# Patient Record
Sex: FEMALE | Race: WHITE | Employment: OTHER | URBAN - METROPOLITAN AREA
[De-identification: names, ages, dates, MRNs, and addresses within clinical notes are randomized per-mention and may not be internally consistent; named-entity substitution may affect disease eponyms.]

---

## 2017-01-02 ENCOUNTER — OFFICE VISIT (OUTPATIENT)
Dept: FAMILY MEDICINE CLINIC | Facility: CLINIC | Age: 82
End: 2017-01-02

## 2017-01-02 VITALS
DIASTOLIC BLOOD PRESSURE: 74 MMHG | TEMPERATURE: 98 F | WEIGHT: 126 LBS | SYSTOLIC BLOOD PRESSURE: 120 MMHG | HEIGHT: 62 IN | BODY MASS INDEX: 23.19 KG/M2 | OXYGEN SATURATION: 98 % | RESPIRATION RATE: 16 BRPM | HEART RATE: 72 BPM

## 2017-01-02 DIAGNOSIS — J00 NASOPHARYNGITIS ACUTE: ICD-10-CM

## 2017-01-02 DIAGNOSIS — N30.00 ACUTE CYSTITIS WITHOUT HEMATURIA: ICD-10-CM

## 2017-01-02 DIAGNOSIS — R30.0 DYSURIA: Primary | ICD-10-CM

## 2017-01-02 LAB
BILIRUBIN: NEGATIVE
GLUCOSE (URINE DIPSTICK): NEGATIVE MG/DL
KETONES (URINE DIPSTICK): NEGATIVE MG/DL
MULTISTIX LOT#: ABNORMAL NUMERIC
NITRITE, URINE: NEGATIVE
PH, URINE: 5.5 (ref 4.5–8)
PROTEIN (URINE DIPSTICK): 30 MG/DL
SPECIFIC GRAVITY: 1.02 (ref 1–1.03)
URINE-COLOR: YELLOW
UROBILINOGEN,SEMI-QN: 0.2 MG/DL (ref 0–1.9)

## 2017-01-02 PROCEDURE — 99213 OFFICE O/P EST LOW 20 MIN: CPT | Performed by: NURSE PRACTITIONER

## 2017-01-02 PROCEDURE — 87088 URINE BACTERIA CULTURE: CPT | Performed by: NURSE PRACTITIONER

## 2017-01-02 PROCEDURE — 81003 URINALYSIS AUTO W/O SCOPE: CPT | Performed by: NURSE PRACTITIONER

## 2017-01-02 PROCEDURE — 87086 URINE CULTURE/COLONY COUNT: CPT | Performed by: NURSE PRACTITIONER

## 2017-01-02 PROCEDURE — 87186 SC STD MICRODIL/AGAR DIL: CPT | Performed by: NURSE PRACTITIONER

## 2017-01-02 RX ORDER — NITROFURANTOIN 25; 75 MG/1; MG/1
100 CAPSULE ORAL 2 TIMES DAILY
Qty: 14 CAPSULE | Refills: 0 | Status: SHIPPED | OUTPATIENT
Start: 2017-01-02 | End: 2020-09-22

## 2017-01-02 RX ORDER — MEMANTINE HYDROCHLORIDE 10 MG/1
10 TABLET ORAL 2 TIMES DAILY
Refills: 6 | Status: ON HOLD | COMMUNITY
Start: 2016-12-14 | End: 2021-03-02

## 2017-01-02 RX ORDER — PRAVASTATIN SODIUM 10 MG
10 TABLET ORAL
Refills: 2 | COMMUNITY
Start: 2016-12-24 | End: 2020-09-22

## 2017-01-02 RX ORDER — AMLODIPINE BESYLATE 5 MG/1
5 TABLET ORAL
Refills: 6 | COMMUNITY
Start: 2016-12-14 | End: 2020-09-22

## 2017-01-02 NOTE — PROGRESS NOTES
CHIEF COMPLAINT:   Patient presents with:  Cold: nasal congestion and cough sx x 3 days. Urinary Frequency sx x 3-4 days. HPI:   Antonia Hadley is a 80year old female who presents for upper respiratory symptoms for 3- 4 days.  She is visiting he Cefuroxime Axetil (CEFTIN) 250 MG Oral Tab Take 1 tablet (250 mg total) by mouth 2 (two) times daily. TAKE WITH FOOD. Disp: 20 tablet Rfl: 0   Fluticasone Propionate 50 MCG/ACT Nasal Suspension 1 spray by Nasal route daily.  1 spray each nostril twice a day ABDOMEN:  BSs present x4. Non tender throughout. No guarding or rebound. BACK: No CVAT.       ASSESSMENT AND PLAN:   Venecia Harrell is a 80year old female who presents with     ASSESSMENT: Dysuria  (primary encounter diagnosis)  Acute cystitis wit You have a viral upper respiratory illness (URI), which is another term for the common cold. This illness is contagious during the first few days. It is spread through the air by coughing and sneezing.  It may also be spread by direct contact (touching the · Cough with lots of colored sputum (mucus)  · Severe headache; face, neck, or ear pain  · Difficulty swallowing due to throat pain  · Fever of 100.4°F (38°C)  Call 911, or get immediate medical care  Call emergency services right away if any of these occu · Urinary retention, being unable to urinate  · Unable to hold urine in (urinary incontinence)  · Fever  · Loss of appetite  · Confusion (in older adults)  Causes  Bladder infections are not contagious.  You can't get one from someone else, from a toilet se · If you are given phenazopydridine to reduce burning with urination, it will cause your urine to become a bright orange color. This can stain clothing.   Care and prevention  These self-care steps can help prevent future infections:  · Drink plenty of flui · Repeated vomiting, or unable to keep medicine down  · Weakness or dizziness  · Vaginal discharge  · Pain, redness, or swelling in the outer vaginal area (labia)  © 3118-7004 The [facility] [place] [address] [place] [address].  All rights

## 2017-01-02 NOTE — PATIENT INSTRUCTIONS
Viral Upper Respiratory Illness (Adult)  You have a viral upper respiratory illness (URI), which is another term for the common cold. This illness is contagious during the first few days. It is spread through the air by coughing and sneezing.  It may also Call your healthcare provider right away if any of these occur:  · Cough with lots of colored sputum (mucus)  · Severe headache; face, neck, or ear pain  · Difficulty swallowing due to throat pain  · Fever of 100.4°F (38°C)  Call 911, or get immediate medi · Cloudy, strong, or bad smelling urine  · Urinary retention, being unable to urinate  · Unable to hold urine in (urinary incontinence)  · Fever  · Loss of appetite  · Confusion (in older adults)  Causes  Bladder infections are not contagious.  You can't ge · If you are given phenazopydridine to reduce burning with urination, it will cause your urine to become a bright orange color. This can stain clothing.   Care and prevention  These self-care steps can help prevent future infections:  · Drink plenty of flui · Repeated vomiting, or unable to keep medicine down  · Weakness or dizziness  · Vaginal discharge  · Pain, redness, or swelling in the outer vaginal area (labia)  © 0323-6512 The 706 [facility] [place], [address] [place] [address].  All rights

## 2017-01-03 NOTE — PROGRESS NOTES
Quick Note:    Prelim result noted. Will await final culture. Patient taking [facility].   ______

## 2017-01-05 ENCOUNTER — TELEPHONE (OUTPATIENT)
Dept: FAMILY MEDICINE CLINIC | Facility: CLINIC | Age: 82
End: 2017-01-05

## 2017-01-05 ENCOUNTER — HOSPITAL ENCOUNTER (OUTPATIENT)
Age: 82
Discharge: HOME | End: 2017-01-05
Attending: FAMILY MEDICINE
Payer: MEDICARE

## 2017-01-05 VITALS
DIASTOLIC BLOOD PRESSURE: 65 MMHG | HEART RATE: 74 BPM | SYSTOLIC BLOOD PRESSURE: 141 MMHG | TEMPERATURE: 98 F | OXYGEN SATURATION: 99 % | RESPIRATION RATE: 16 BRPM

## 2017-01-05 DIAGNOSIS — J01.00 ACUTE NON-RECURRENT MAXILLARY SINUSITIS: Primary | ICD-10-CM

## 2017-01-05 PROCEDURE — 99213 OFFICE O/P EST LOW 20 MIN: CPT

## 2017-01-05 PROCEDURE — 99204 OFFICE O/P NEW MOD 45 MIN: CPT

## 2017-01-05 RX ORDER — CEFUROXIME AXETIL 250 MG/1
250 TABLET ORAL 2 TIMES DAILY
Qty: 20 TABLET | Refills: 0 | Status: SHIPPED | OUTPATIENT
Start: 2017-01-05 | End: 2020-09-22

## 2017-01-05 RX ORDER — FLUTICASONE PROPIONATE 50 MCG
SPRAY, SUSPENSION (ML) NASAL DAILY
COMMUNITY
End: 2020-09-22

## 2017-01-05 RX ORDER — FLUTICASONE PROPIONATE 50 MCG
1 SPRAY, SUSPENSION (ML) NASAL DAILY
Qty: 16 G | Refills: 0 | Status: SHIPPED | OUTPATIENT
Start: 2017-01-05 | End: 2018-01-05

## 2017-01-05 NOTE — TELEPHONE ENCOUNTER
Granddaughter called on behalf of patient. States she is feeling worse, has a productive cough. Denies dyspnea. Advised her to take patient to ED or IC for possible CXR and eval before she flies back to [place].   Agrees with plan, no further question

## 2017-01-05 NOTE — ED PROVIDER NOTES
Patient Seen in: [facility] END    History   Patient presents with:  Cough  Sinus Problem    Stated Complaint: URI     HPI    This 44-year-old female presents with a 10 day history of sinus congestion, stuffy nose, cough which is pr equivalent per week      Review of Systems    Positive for stated complaint: URI   Other systems are as noted in HPI. Constitutional and vital signs reviewed. All other systems reviewed and negative except as noted above.     PSFH elements reviewed fr primary doctor back in [place] if she is not improving or sooner if she has bloody mucus or shortness of breath.       Disposition and Plan     Clinical Impression:  Acute non-recurrent maxillary sinusitis  (primary encounter diagnosis)    Disposition:

## 2020-09-22 ENCOUNTER — APPOINTMENT (OUTPATIENT)
Dept: GENERAL RADIOLOGY | Facility: HOSPITAL | Age: 85
End: 2020-09-22
Attending: EMERGENCY MEDICINE
Payer: MEDICARE

## 2020-09-22 PROCEDURE — 71045 X-RAY EXAM CHEST 1 VIEW: CPT | Performed by: EMERGENCY MEDICINE

## 2020-09-23 NOTE — ED PROVIDER NOTES
Patient Seen in: [facility] Emergency Department      History   Patient presents with:  Difficulty Breathing    Stated Complaint: RHONDA    HPI    Patient is a pleasant 75-year-old female nursing home resident, sent for evaluation of dyspnea.     Family comfortably on the cart, in no apparent distress. HEENT: Head is without evidence of trauma. Extraocular muscles are intact. Pupils are equal and reactive to light. Oropharynx is pink and moist.  NECK: Neck is supple and nontender.  The trachea is midline ROUTINE   CBC W/ DIFFERENTIAL        Xr Chest Ap Portable  (cpt=71045)    Result Date: 9/22/2020  PROCEDURE:  XR CHEST AP PORTABLE  (CPT=71045)  TECHNIQUE:  AP chest radiograph was obtained. COMPARISON:  None.   INDICATIONS:  RHONDA  PATIENT STATED HISTORY: ( Prescribed:  Current Discharge Medication List

## 2020-09-23 NOTE — ED NOTES
Called report to [facility] RN at [employer] in [place]. Pt instructed to follow up with PCP in the next week. Pts daughter will be driving patient home.

## 2020-09-23 NOTE — ED INITIAL ASSESSMENT (HPI)
PT INFORMED A STAFF MEMBER AT NURSING HOME That SHE WAS HAVING TROUBLE BREATHING. PT WAS IN NO DISTRESS, WAS GIVEN LORAZEPAM, FAMILY WANTED PATIENT TRANSPORTED FOR EVALUATION. PT HAS NO COMPLAINTS. PT HAS DEMENTIA, ORIENTED TO SELF.

## 2021-02-16 ENCOUNTER — HOSPITAL ENCOUNTER (EMERGENCY)
Facility: HOSPITAL | Age: 86
Discharge: HOME | End: 2021-02-16
Attending: EMERGENCY MEDICINE
Payer: MEDICARE

## 2021-02-16 ENCOUNTER — APPOINTMENT (OUTPATIENT)
Dept: CT IMAGING | Facility: HOSPITAL | Age: 86
End: 2021-02-16
Attending: EMERGENCY MEDICINE
Payer: MEDICARE

## 2021-02-16 VITALS
TEMPERATURE: 97 F | SYSTOLIC BLOOD PRESSURE: 132 MMHG | HEART RATE: 63 BPM | HEIGHT: 62 IN | RESPIRATION RATE: 18 BRPM | BODY MASS INDEX: 20.29 KG/M2 | WEIGHT: 110.25 LBS | DIASTOLIC BLOOD PRESSURE: 66 MMHG | OXYGEN SATURATION: 100 %

## 2021-02-16 DIAGNOSIS — S09.90XA INJURY OF HEAD, INITIAL ENCOUNTER: Primary | ICD-10-CM

## 2021-02-16 PROCEDURE — 99284 EMERGENCY DEPT VISIT MOD MDM: CPT

## 2021-02-16 PROCEDURE — 70450 CT HEAD/BRAIN W/O DYE: CPT | Performed by: EMERGENCY MEDICINE

## 2021-02-16 NOTE — ED PROVIDER NOTES
Patient Seen in: [facility] Emergency Department      History   Patient presents with:  Fall    Stated Complaint: Fall    HPI/Subjective:   HPI    51-year-old female with history of dementia sent from nursing home for evaluation of head injury.   Brea Calzada nontender. No abdominal masses. No peritoneal signs   Extremities: Full range of motion at all joints of all 4 extremities without discomfort. No obvious deformities. No evidence of traumatic injury to the extremities.   No edema, normal peripheral puls

## 2021-02-16 NOTE — ED INITIAL ASSESSMENT (HPI)
27-year-old female arrived via EMS from [place] due to an un witnessed fall, patient was found by nursing staff sitting up in her room. No on blood thinners, usually A&Ox1 which is her baseline. 5cm hematoma to back of her head.

## 2021-02-19 ENCOUNTER — APPOINTMENT (OUTPATIENT)
Dept: CT IMAGING | Facility: HOSPITAL | Age: 86
End: 2021-02-19
Attending: EMERGENCY MEDICINE
Payer: MEDICARE

## 2021-02-19 ENCOUNTER — HOSPITAL ENCOUNTER (EMERGENCY)
Facility: HOSPITAL | Age: 86
Discharge: HOME | End: 2021-02-20
Attending: EMERGENCY MEDICINE
Payer: MEDICARE

## 2021-02-19 VITALS
HEART RATE: 68 BPM | HEIGHT: 63 IN | OXYGEN SATURATION: 98 % | SYSTOLIC BLOOD PRESSURE: 117 MMHG | RESPIRATION RATE: 16 BRPM | DIASTOLIC BLOOD PRESSURE: 68 MMHG | TEMPERATURE: 97 F | WEIGHT: 110.25 LBS | BODY MASS INDEX: 19.54 KG/M2
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
## 2021-02-19 DIAGNOSIS — F03.90 DEMENTIA WITHOUT BEHAVIORAL DISTURBANCE, UNSPECIFIED DEMENTIA TYPE (HCC): ICD-10-CM

## 2021-02-19 DIAGNOSIS — S09.90XA MINOR HEAD INJURY, INITIAL ENCOUNTER: ICD-10-CM

## 2021-02-19 DIAGNOSIS — T14.8XXA ABRASION: ICD-10-CM

## 2021-02-19 DIAGNOSIS — S00.83XA CONTUSION OF FACE, INITIAL ENCOUNTER: ICD-10-CM

## 2021-02-19 PROCEDURE — 70450 CT HEAD/BRAIN W/O DYE: CPT | Performed by: EMERGENCY MEDICINE

## 2021-02-19 PROCEDURE — 99284 EMERGENCY DEPT VISIT MOD MDM: CPT

## 2021-02-19 PROCEDURE — 72125 CT NECK SPINE W/O DYE: CPT | Performed by: EMERGENCY MEDICINE

## 2021-02-20 NOTE — ED PROVIDER NOTES
Patient Seen in: [facility] Emergency Department      History   No chief complaint on file.     Stated Complaint: Fall    HPI/Subjective:   HPI    27-year-old female who has dementia and therefore is unable to give us any history level 5 exemption ta General: No tenderness, deformity or signs of injury. Skin:     General: Skin is warm. Neurological:      Mental Status: She is alert. Mental status is at baseline.               ED Course   Labs Reviewed - No data to display       Ct Brain Or Head ( CONCLUSION:  1. Stable chronic changes. No acute disease. Limited evaluation of the inferior cerebellum due to artifact on all imaging sequences.     Dictated by (CST): Jeremie Glynn MD on 2/19/2021 at 11:11 PM     Finalized by (CST): Ross Leonard CONCLUSION:  Right parietal scalp hematoma without fracture. No hemorrhage. Incidental note is made of moderate atrophy especially the temporal lobes. There is a 1 cm calcified meningioma right high convexity parasagittal parietal region.    Dictated by Patient is noted to have the injury to her head and appears that her fall was related to her getting out of bed. It is unclear if she had loss of consciousness and because of advanced age and dementia she had a CT scan of her head.   There is no acute intr

## 2021-02-20 NOTE — ED INITIAL ASSESSMENT (HPI)
Pt arrived via EMS from [facility] for fall. Pt was put to bed and found 30 minutes after on the floor, presents with bump to forehead and abrasion to nose. Pt baseline is A&Ox1 per medics.

## 2021-02-27 ENCOUNTER — HOSPITAL ENCOUNTER (OUTPATIENT)
Facility: HOSPITAL | Age: 86
Setting detail: OBSERVATION
Discharge: HOSPICE | End: 2021-03-02
Attending: EMERGENCY MEDICINE | Admitting: HOSPITALIST
Payer: MEDICARE

## 2021-02-27 ENCOUNTER — APPOINTMENT (OUTPATIENT)
Dept: GENERAL RADIOLOGY | Facility: HOSPITAL | Age: 86
End: 2021-02-27
Attending: EMERGENCY MEDICINE
Payer: MEDICARE

## 2021-02-27 DIAGNOSIS — R41.82 ALTERED MENTAL STATUS, UNSPECIFIED ALTERED MENTAL STATUS TYPE: Primary | ICD-10-CM

## 2021-02-27 DIAGNOSIS — R13.10 DYSPHAGIA, UNSPECIFIED TYPE: ICD-10-CM

## 2021-02-27 LAB
ALBUMIN SERPL-MCNC: 2.5 G/DL (ref 3.4–5)
ALBUMIN/GLOB SERPL: 0.6 {RATIO} (ref 1–2)
ALP LIVER SERPL-CCNC: 85 U/L
ALT SERPL-CCNC: 10 U/L
ANION GAP SERPL CALC-SCNC: 6 MMOL/L (ref 0–18)
AST SERPL-CCNC: 13 U/L (ref 15–37)
BASOPHILS # BLD AUTO: 0.03 X10(3) UL (ref 0–0.2)
BASOPHILS NFR BLD AUTO: 0.3 %
BILIRUB SERPL-MCNC: 0.3 MG/DL (ref 0.1–2)
BILIRUB UR QL STRIP.AUTO: NEGATIVE
BUN BLD-MCNC: 12 MG/DL (ref 7–18)
BUN/CREAT SERPL: 13 (ref 10–20)
CALCIUM BLD-MCNC: 8.9 MG/DL (ref 8.5–10.1)
CHLORIDE SERPL-SCNC: 103 MMOL/L (ref 98–112)
CLARITY UR REFRACT.AUTO: CLEAR
CO2 SERPL-SCNC: 32 MMOL/L (ref 21–32)
COLOR UR AUTO: YELLOW
CREAT BLD-MCNC: 0.92 MG/DL
DEPRECATED RDW RBC AUTO: 55.8 FL (ref 35.1–46.3)
EOSINOPHIL # BLD AUTO: 0.08 X10(3) UL (ref 0–0.7)
EOSINOPHIL NFR BLD AUTO: 0.7 %
ERYTHROCYTE [DISTWIDTH] IN BLOOD BY AUTOMATED COUNT: 16.6 % (ref 11–15)
GLOBULIN PLAS-MCNC: 4.4 G/DL (ref 2.8–4.4)
GLUCOSE BLD-MCNC: 76 MG/DL (ref 70–99)
GLUCOSE UR STRIP.AUTO-MCNC: NEGATIVE MG/DL
HCT VFR BLD AUTO: 37.5 %
HGB BLD-MCNC: 12.1 G/DL
HYALINE CASTS #/AREA URNS AUTO: PRESENT /LPF
IMM GRANULOCYTES # BLD AUTO: 0.04 X10(3) UL (ref 0–1)
IMM GRANULOCYTES NFR BLD: 0.3 %
LEUKOCYTE ESTERASE UR QL STRIP.AUTO: NEGATIVE
LYMPHOCYTES # BLD AUTO: 1.09 X10(3) UL (ref 1–4)
LYMPHOCYTES NFR BLD AUTO: 9.2 %
M PROTEIN MFR SERPL ELPH: 6.9 G/DL (ref 6.4–8.2)
MCH RBC QN AUTO: 29.7 PG (ref 26–34)
MCHC RBC AUTO-ENTMCNC: 32.3 G/DL (ref 31–37)
MCV RBC AUTO: 91.9 FL
MONOCYTES # BLD AUTO: 1.29 X10(3) UL (ref 0.1–1)
MONOCYTES NFR BLD AUTO: 10.9 %
NEUTROPHILS # BLD AUTO: 9.26 X10 (3) UL (ref 1.5–7.7)
NEUTROPHILS # BLD AUTO: 9.26 X10(3) UL (ref 1.5–7.7)
NEUTROPHILS NFR BLD AUTO: 78.6 %
NITRITE UR QL STRIP.AUTO: NEGATIVE
OSMOLALITY SERPL CALC.SUM OF ELEC: 291 MOSM/KG (ref 275–295)
PH UR STRIP.AUTO: 6 [PH] (ref 5–8)
PLATELET # BLD AUTO: 225 10(3)UL (ref 150–450)
POTASSIUM SERPL-SCNC: 4.2 MMOL/L (ref 3.5–5.1)
PROT UR STRIP.AUTO-MCNC: NEGATIVE MG/DL
RBC # BLD AUTO: 4.08 X10(6)UL
RBC UR QL AUTO: NEGATIVE
SARS-COV-2 RNA RESP QL NAA+PROBE: NOT DETECTED
SODIUM SERPL-SCNC: 141 MMOL/L (ref 136–145)
SP GR UR STRIP.AUTO: 1.01 (ref 1–1.03)
T4 FREE SERPL-MCNC: 1 NG/DL (ref 0.8–1.7)
TROPONIN I SERPL-MCNC: <0.045 NG/ML (ref ?–0.04)
TSI SER-ACNC: 3.77 MIU/ML (ref 0.36–3.74)
UROBILINOGEN UR STRIP.AUTO-MCNC: <2 MG/DL
VALPROATE SERPL-MCNC: 62.6 UG/ML (ref 50–100)
WBC # BLD AUTO: 11.8 X10(3) UL (ref 4–11)

## 2021-02-27 PROCEDURE — 71045 X-RAY EXAM CHEST 1 VIEW: CPT | Performed by: EMERGENCY MEDICINE

## 2021-02-27 PROCEDURE — 99220 INITIAL OBSERVATION CARE,LEVL III: CPT | Performed by: INTERNAL MEDICINE

## 2021-02-27 RX ORDER — DOCUSATE SODIUM 100 MG/1
100 CAPSULE, LIQUID FILLED ORAL 2 TIMES DAILY
Status: DISCONTINUED | OUTPATIENT
Start: 2021-02-27 | End: 2021-02-28

## 2021-02-27 RX ORDER — MELATONIN
325
Status: ON HOLD | COMMUNITY
End: 2021-03-02

## 2021-02-27 RX ORDER — CETIRIZINE HYDROCHLORIDE 10 MG/1
10 TABLET ORAL DAILY
Status: DISCONTINUED | OUTPATIENT
Start: 2021-02-27 | End: 2021-02-27

## 2021-02-27 RX ORDER — MEMANTINE HYDROCHLORIDE 10 MG/1
10 TABLET ORAL
Status: DISCONTINUED | OUTPATIENT
Start: 2021-02-28 | End: 2021-02-28

## 2021-02-27 RX ORDER — RISPERIDONE 0.25 MG/1
0.25 TABLET, FILM COATED ORAL NIGHTLY PRN
COMMUNITY

## 2021-02-27 RX ORDER — DONEPEZIL HYDROCHLORIDE 10 MG/1
10 TABLET, FILM COATED ORAL NIGHTLY
Status: ON HOLD | COMMUNITY
End: 2021-03-02

## 2021-02-27 RX ORDER — DEXTROSE AND SODIUM CHLORIDE 5; .45 G/100ML; G/100ML
INJECTION, SOLUTION INTRAVENOUS CONTINUOUS
Status: DISCONTINUED | OUTPATIENT
Start: 2021-02-27 | End: 2021-03-02

## 2021-02-27 RX ORDER — DIVALPROEX SODIUM 125 MG/1
500 CAPSULE, COATED PELLETS ORAL NIGHTLY
Status: ON HOLD | COMMUNITY
End: 2021-03-02

## 2021-02-27 RX ORDER — MONTELUKAST SODIUM 10 MG/1
10 TABLET ORAL NIGHTLY
Status: DISCONTINUED | OUTPATIENT
Start: 2021-02-27 | End: 2021-02-27

## 2021-02-27 RX ORDER — RISPERIDONE 0.25 MG/1
0.25 TABLET, FILM COATED ORAL NIGHTLY PRN
Status: DISCONTINUED | OUTPATIENT
Start: 2021-02-27 | End: 2021-02-28

## 2021-02-27 RX ORDER — DIVALPROEX SODIUM 125 MG/1
500 CAPSULE, COATED PELLETS ORAL NIGHTLY
Status: DISCONTINUED | OUTPATIENT
Start: 2021-02-27 | End: 2021-02-28

## 2021-02-27 RX ORDER — ROSUVASTATIN CALCIUM 10 MG/1
10 TABLET, COATED ORAL NIGHTLY
Status: DISCONTINUED | OUTPATIENT
Start: 2021-02-27 | End: 2021-02-28

## 2021-02-27 RX ORDER — NALOXONE HYDROCHLORIDE 1 MG/ML
1 INJECTION INTRAMUSCULAR; INTRAVENOUS; SUBCUTANEOUS ONCE
Status: COMPLETED | OUTPATIENT
Start: 2021-02-27 | End: 2021-02-27

## 2021-02-27 RX ORDER — DONEPEZIL HYDROCHLORIDE 5 MG/1
5 TABLET, FILM COATED ORAL NIGHTLY
Status: DISCONTINUED | OUTPATIENT
Start: 2021-02-27 | End: 2021-02-28

## 2021-02-27 RX ORDER — MONTELUKAST SODIUM 10 MG/1
10 TABLET ORAL EVERY MORNING
COMMUNITY

## 2021-02-27 RX ORDER — HEPARIN SODIUM 5000 [USP'U]/ML
5000 INJECTION, SOLUTION INTRAVENOUS; SUBCUTANEOUS EVERY 8 HOURS SCHEDULED
Status: DISCONTINUED | OUTPATIENT
Start: 2021-02-27 | End: 2021-03-02

## 2021-02-27 RX ORDER — NALOXONE HYDROCHLORIDE 0.4 MG/ML
0.4 INJECTION, SOLUTION INTRAMUSCULAR; INTRAVENOUS; SUBCUTANEOUS ONCE
Status: COMPLETED | OUTPATIENT
Start: 2021-02-27 | End: 2021-02-27

## 2021-02-27 RX ORDER — MELATONIN
325
Status: DISCONTINUED | OUTPATIENT
Start: 2021-02-28 | End: 2021-02-28

## 2021-02-27 RX ORDER — ESCITALOPRAM OXALATE 10 MG/1
15 TABLET ORAL DAILY
COMMUNITY

## 2021-02-27 RX ORDER — SODIUM CHLORIDE 9 MG/ML
INJECTION, SOLUTION INTRAVENOUS CONTINUOUS
Status: ACTIVE | OUTPATIENT
Start: 2021-02-27 | End: 2021-02-27

## 2021-02-27 RX ORDER — ACETAMINOPHEN 325 MG/1
650 TABLET ORAL EVERY 6 HOURS PRN
Status: DISCONTINUED | OUTPATIENT
Start: 2021-02-27 | End: 2021-03-02

## 2021-02-27 NOTE — ED INITIAL ASSESSMENT (HPI)
Rapid decline in health over the last 2 weeks. usuallu A=O X 2 now not really talking much. Not very active just lays in bed stairs into space. Also recently failed swallow study.

## 2021-02-27 NOTE — H&P
[facility] H[facility]PITAL[facility]  History and Physical     Hollie Gonzalez Patient Status:  Emergency    1931 MRN [id number]   Location [address] Attending Charity Recio MD   Hosp Day # 0 PCP Evelia Gipson MD     Chief Co Rfl:     •  naproxen 250 MG Oral Tab, Take 250 mg by mouth 2 (two) times daily with meals. , Disp: , Rfl:     •  Vitamin D3 25 MCG (1000 UT) Oral Tab, Take 1,000 Units by mouth daily. , Disp: , Rfl:     •  acetaminophen 325 MG Oral Tab, Take 325 mg by mouth rashes or lesions.    Psychiatric: cannot assess      Diagnostic Data:      Labs:  Recent Labs   Lab 02/27/21  1233   WBC 11.8*   HGB 12.1   MCV 91.9   .0       Recent Labs   Lab 02/27/21  1233   GLU 76   BUN 12   CREATSERUM 0.92   GFRAA 64   GFRNAA

## 2021-02-27 NOTE — ED PROVIDER NOTES
Patient Seen in: [facility] Emergency Department      History   Patient presents with:  Altered Mental Status    Stated Complaint: decline in health    HPI/Subjective:   HPI    70-year-old female from the memory care unit at [facility] trace is here for is soft nontender the extremities there is no clubbing cyanosis or edema neurologic exam patient is lethargic somewhat obtunded nonresponsive. Does withdraw to painful stimuli but does not follow any commands or have purposeful movements.     ED Course care physician and gastroenterology patient will be admitted for GI evaluation possible endoscopy failure to thrive weight loss altered mental status  Admission disposition: 2/27/2021  2:25 PM                              Disposition and Plan     Clinical

## 2021-02-28 ENCOUNTER — APPOINTMENT (OUTPATIENT)
Dept: CT IMAGING | Facility: HOSPITAL | Age: 86
End: 2021-02-28
Attending: INTERNAL MEDICINE
Payer: MEDICARE

## 2021-02-28 LAB
ALLENS TEST: POSITIVE
ANION GAP SERPL CALC-SCNC: 5 MMOL/L (ref 0–18)
ARTERIAL BLD GAS O2 SATURATION: 94 % (ref 92–100)
ARTERIAL BLOOD GAS BASE EXCESS: 1.7 MMOL/L (ref ?–2)
ARTERIAL BLOOD GAS HCO3: 25.2 MEQ/L (ref 22–26)
ARTERIAL BLOOD GAS PCO2: 35 MM HG (ref 35–45)
ARTERIAL BLOOD GAS PH: 7.47 (ref 7.35–7.45)
ARTERIAL BLOOD GAS PO2: 68 MM HG (ref 80–105)
ATRIAL RATE: 80 BPM
BASOPHILS # BLD AUTO: 0.02 X10(3) UL (ref 0–0.2)
BASOPHILS NFR BLD AUTO: 0.2 %
BUN BLD-MCNC: 7 MG/DL (ref 7–18)
BUN/CREAT SERPL: 13 (ref 10–20)
CALCIUM BLD-MCNC: 8.3 MG/DL (ref 8.5–10.1)
CALCULATED O2 SATURATION: 95 % (ref 92–100)
CARBOXYHEMOGLOBIN: 1.5 % SAT (ref 0–3)
CHLORIDE SERPL-SCNC: 105 MMOL/L (ref 98–112)
CO2 SERPL-SCNC: 29 MMOL/L (ref 21–32)
CREAT BLD-MCNC: 0.54 MG/DL
DEPRECATED RDW RBC AUTO: 55.8 FL (ref 35.1–46.3)
EOSINOPHIL # BLD AUTO: 0.12 X10(3) UL (ref 0–0.7)
EOSINOPHIL NFR BLD AUTO: 1.4 %
ERYTHROCYTE [DISTWIDTH] IN BLOOD BY AUTOMATED COUNT: 16.3 % (ref 11–15)
GLUCOSE BLD-MCNC: 72 MG/DL (ref 70–99)
HAV IGM SER QL: 2.1 MG/DL (ref 1.6–2.6)
HCT VFR BLD AUTO: 34.8 %
HGB BLD-MCNC: 10.8 G/DL
IMM GRANULOCYTES # BLD AUTO: 0.03 X10(3) UL (ref 0–1)
IMM GRANULOCYTES NFR BLD: 0.3 %
L/M: 21 L/MIN
LYMPHOCYTES # BLD AUTO: 1.18 X10(3) UL (ref 1–4)
LYMPHOCYTES NFR BLD AUTO: 13.5 %
MCH RBC QN AUTO: 29 PG (ref 26–34)
MCHC RBC AUTO-ENTMCNC: 31 G/DL (ref 31–37)
MCV RBC AUTO: 93.5 FL
METHEMOGLOBIN: 0.6 % SAT (ref 0.4–1.5)
MONOCYTES # BLD AUTO: 1.12 X10(3) UL (ref 0.1–1)
MONOCYTES NFR BLD AUTO: 12.8 %
NEUTROPHILS # BLD AUTO: 6.28 X10 (3) UL (ref 1.5–7.7)
NEUTROPHILS # BLD AUTO: 6.28 X10(3) UL (ref 1.5–7.7)
NEUTROPHILS NFR BLD AUTO: 71.8 %
OSMOLALITY SERPL CALC.SUM OF ELEC: 285 MOSM/KG (ref 275–295)
P AXIS: 38 DEGREES
P-R INTERVAL: 156 MS
PATIENT TEMPERATURE: 97.8 F
PHOSPHATE SERPL-MCNC: 2.5 MG/DL (ref 2.5–4.9)
PLATELET # BLD AUTO: 201 10(3)UL (ref 150–450)
POTASSIUM SERPL-SCNC: 3.6 MMOL/L (ref 3.5–5.1)
Q-T INTERVAL: 424 MS
QRS DURATION: 78 MS
QTC CALCULATION (BEZET): 489 MS
R AXIS: 13 DEGREES
RBC # BLD AUTO: 3.72 X10(6)UL
SODIUM SERPL-SCNC: 139 MMOL/L (ref 136–145)
T AXIS: 74 DEGREES
TOTAL HEMOGLOBIN: 11.7 G/DL
VENTRICULAR RATE: 80 BPM
WBC # BLD AUTO: 8.8 X10(3) UL (ref 4–11)

## 2021-02-28 PROCEDURE — 99225 SUBSEQUENT OBSERVATION CARE: CPT | Performed by: INTERNAL MEDICINE

## 2021-02-28 PROCEDURE — 70450 CT HEAD/BRAIN W/O DYE: CPT | Performed by: INTERNAL MEDICINE

## 2021-02-28 RX ORDER — ACETAMINOPHEN 120 MG/1
120 SUPPOSITORY RECTAL EVERY 6 HOURS PRN
Status: DISCONTINUED | OUTPATIENT
Start: 2021-02-28 | End: 2021-03-02

## 2021-02-28 RX ORDER — KETOROLAC TROMETHAMINE 15 MG/ML
15 INJECTION, SOLUTION INTRAMUSCULAR; INTRAVENOUS EVERY 8 HOURS PRN
Status: DISCONTINUED | OUTPATIENT
Start: 2021-02-28 | End: 2021-03-01

## 2021-02-28 RX ORDER — ROSUVASTATIN CALCIUM 5 MG/1
5 TABLET, COATED ORAL NIGHTLY
Status: DISCONTINUED | OUTPATIENT
Start: 2021-02-28 | End: 2021-02-28

## 2021-02-28 RX ORDER — MEMANTINE HYDROCHLORIDE 10 MG/1
10 TABLET ORAL 2 TIMES DAILY
Status: DISCONTINUED | OUTPATIENT
Start: 2021-02-28 | End: 2021-02-28

## 2021-02-28 RX ORDER — DONEPEZIL HYDROCHLORIDE 10 MG/1
10 TABLET, FILM COATED ORAL NIGHTLY
Status: DISCONTINUED | OUTPATIENT
Start: 2021-02-28 | End: 2021-02-28

## 2021-02-28 RX ORDER — TROLAMINE SALICYLATE 10 G/100G
CREAM TOPICAL 3 TIMES DAILY PRN
Status: DISCONTINUED | OUTPATIENT
Start: 2021-02-28 | End: 2021-03-02

## 2021-02-28 NOTE — PROGRESS NOTES
[facility]ITAL[facility]  Progress Note     [facility] Patient Status:  Observation    1931 MRN [id number]   [facility] 5NW-A Attending Sanam Anders, DO   Hosp Day # 0 PCP Betsy Buck MD     Chief Complaint: AMS, FTT    S: 02/27/2021       Imaging: Imaging data reviewed in Epic.     Medications:   • docusate sodium  100 mg Oral BID   • Memantine HCl  10 mg Oral Daily   • Rosuvastatin Calcium  10 mg Oral Nightly   • Donepezil HCl  5 mg Oral Nightly   • Heparin Sodium (Porcine)

## 2021-02-28 NOTE — PHYSICAL THERAPY NOTE
PHYSICAL THERAPY EVALUATION - INPATIENT     Room Number: 520/520-A  Evaluation Date: 2/28/2021  Type of Evaluation: Initial  Physician Order: PT Eval and Treat    Presenting Problem: AMS, dyspnea  Reason for Therapy: Mobility Dysfunction and Discharg conversation  · Safety Judgement:  decreased awareness of need for assistance and decreased awareness of need for safety    RANGE OF MOTION AND STRENGTH ASSESSMENT  Upper extremity ROM and strength are within functional limits     Lower extremity ROM is wi increased cuing. Pt sit-stand with RW and MIN assist for balance. Pt ambulated 3ft with RW and MIN assist toward [address]. Pt ambulates with shuffle gait pattern and retropulsion. Pt given VC for sequencing. Pt returned to supine in bed with supervision.  Pt edu (Obs): 3-5x/week  Number of Visits to Meet Established Goals: 3      CURRENT GOALS    Goal #1 Patient is able to demonstrate supine - sit EOB @ level: supervision     Goal #2 Patient is able to demonstrate transfers EOB to/from [facility] at assistance level: sup

## 2021-02-28 NOTE — PROGRESS NOTES
ADULT SWALLOWING EVALUATION    ASSESSMENT    ASSESSMENT/OVERALL IMPRESSION:    SLP order received. Medical chart reviewed. Clinical swallow evaluation completed on this date. The patient is a 80-year old female. Dx: Altered Mental Status PMHX:Dementia.   Ba failure to thrive/ weight loss  3.  VFSS to instrumentally assess swallowing physiology with goals to follow    RECOMMENDATIONS   Diet Recommendations - Solids: NPO  Diet Recommendations - Liquid: NPO       Medication Administration Recommendations: Batool Posey Impaired  Mastication: (DNT.  Baseline diet Puree/ Nectar)  Retention: Intact    Pharyngeal Phase of Swallow: Impaired  Laryngeal Elevation Timing: Appears impaired  Laryngeal Elevation Strength: Appears impaired  Laryngeal Elevation Coordination: Appears i

## 2021-02-28 NOTE — PLAN OF CARE
Problem: Patient/Family Goals  Goal: Patient/Family Short Term Goal  Description: Patient's Short Term Goal:   02/27 nocs: be less aggressive  Interventions:   - prn risperidal  - See additional Care Plan goals for specific interventions  Outcome: Progre ordered parameters to optimize cerebral perfusion and minimize risk of hemorrhage  - Monitor temperature, glucose, and sodium.  Initiate appropriate interventions as ordered  Outcome: Not Progressing       Pt received tonight aggressive and agitated, she pu

## 2021-02-28 NOTE — DIETARY NOTE
[facility]    NUTRITION INITIAL ASSESSMENT    NUTRITION DIAGNOSIS/PROBLEM:    Inadequate energy intake related to decreased ability to consume sufficient energy intake (dementia, AMS) as evidenced by poor appetite/PO intakes and current NPO status. Supplements: none at this time    FOOD/NUTRITION RELATED HISTORY:  Appetite: Poor  Intake: 0%-NPO  Intake Meeting Needs: No  Food Allergies: No  Cultural/Ethnic/Cheondoism Preferences Addresses: Yes    NUTRITION RELATED PHYSICAL FINDINGS:  Unable to conduct

## 2021-02-28 NOTE — CM/SW NOTE
02/28/21 1000   CM/SW Referral Data   Referral Source Social Work (self-referral)   Reason for Referral Discharge planning   Informant Children   Patient Info   Patient's Mental Status Confused   [facility]

## 2021-02-28 NOTE — PROGRESS NOTES
NURSING ADMISSION NOTE      Patient admitted via Cart  Oriented to room. Safety precautions initiated. Bed in low position. Call light in reach. Patient admitted in stable condition. Admission navigator based on NH paperwork and report.  Pt is AOx

## 2021-03-01 ENCOUNTER — NURSE ONLY (OUTPATIENT)
Dept: ELECTROPHYSIOLOGY | Facility: HOSPITAL | Age: 86
End: 2021-03-01
Attending: Other
Payer: MEDICARE

## 2021-03-01 ENCOUNTER — APPOINTMENT (OUTPATIENT)
Dept: MRI IMAGING | Facility: HOSPITAL | Age: 86
End: 2021-03-01
Attending: Other
Payer: MEDICARE

## 2021-03-01 ENCOUNTER — APPOINTMENT (OUTPATIENT)
Dept: GENERAL RADIOLOGY | Facility: HOSPITAL | Age: 86
End: 2021-03-01
Attending: INTERNAL MEDICINE
Payer: MEDICARE

## 2021-03-01 LAB — POTASSIUM SERPL-SCNC: 3.8 MMOL/L (ref 3.5–5.1)

## 2021-03-01 PROCEDURE — 99225 SUBSEQUENT OBSERVATION CARE: CPT | Performed by: INTERNAL MEDICINE

## 2021-03-01 PROCEDURE — 4A00X4Z MEASUREMENT OF CENTRAL NERVOUS ELECTRICAL ACTIVITY, EXTERNAL APPROACH: ICD-10-PCS | Performed by: OTHER

## 2021-03-01 PROCEDURE — 95819 EEG AWAKE AND ASLEEP: CPT | Performed by: OTHER

## 2021-03-01 PROCEDURE — 99204 OFFICE O/P NEW MOD 45 MIN: CPT | Performed by: OTHER

## 2021-03-01 PROCEDURE — 74230 X-RAY XM SWLNG FUNCJ C+: CPT | Performed by: INTERNAL MEDICINE

## 2021-03-01 RX ORDER — LORAZEPAM 2 MG/ML
0.5 INJECTION INTRAMUSCULAR EVERY 30 MIN PRN
Status: DISCONTINUED | OUTPATIENT
Start: 2021-03-01 | End: 2021-03-02

## 2021-03-01 RX ORDER — KETOROLAC TROMETHAMINE 15 MG/ML
15 INJECTION, SOLUTION INTRAMUSCULAR; INTRAVENOUS EVERY 12 HOURS PRN
Status: DISCONTINUED | OUTPATIENT
Start: 2021-03-01 | End: 2021-03-02

## 2021-03-01 NOTE — PROGRESS NOTES
Patient alert to self only. Confused and can be agitated at times. Taken off all meds that can cause sedation or drowsiness. On and off sleep all shift. Family at bedside most of shift. Speech eval done: failed bedside speech. Video swallow to be done.  NP

## 2021-03-01 NOTE — PROGRESS NOTES
[facility]ITAL[facility]  Progress Note     Cam Aynelda Patient Status:  Observation    1931 MRN [id number]   [facility] 5NW-A Attending Yasmeen Garcia DO   Hosp Day # 0 PCP Rigo Henderson MD     Chief Complaint: AMS, FTT    S: input(s): PTP, INR in the last 168 hours. Recent Labs   Lab 02/27/21  1233   TROP <0.045            Imaging: Imaging data reviewed in Epic. Medications:   • Heparin Sodium (Porcine)  5,000 Units Subcutaneous Q8H [address]       ASSESSMENT / PLAN:     1.  Un

## 2021-03-01 NOTE — VIDEO SWALLOW STUDY NOTE
ADULT VIDEOFLUOROSCOPIC SWALLOWING STUDY    Admission Date: 2/27/2021  Evaluation Date: 03/01/21  Radiologist: Dr. Ramona Erazo: NPO  Diet Recommendations - Liquid: NPO    Further Follow-up:  Follow Up Needed: Daryl Gottron transit bolus posteriorly)  Cleared/Reduced with: (n/a)  Laryngeal Penetration: (n/a)  Tracheal Aspiration: (n/a)        Penetration Aspiration Scale Score: (severe oral dysphagia/unable to transit posteriorly)       Overall Impression:     Pt positioned u

## 2021-03-01 NOTE — CONSULTS
[address]  Neurology Consultation    Date of consult: 3/1/2021    Reason for consult: altered mental status    HPI: Houston Tremaine is a 80year old female with past medical history as listed below presented with altered mental status,  nancy anicteric; scalp is atrauamatic; no thyromegaly  Neck: Supple; no JVD; no carotid bruits  Cardiac: Regular rate and regular rhythm  Lungs: Clear to auscultation bilaterally  Abdomen: Soft, non-tender; bowel sounds are normoactive  Extremities: No clubbing

## 2021-03-01 NOTE — PROCEDURES
Date of Procedure: 3/1/2021    Procedure: EEG (ELECTROENCEPHALOGRAM)     DX:AMS  HX:A 39US FEMALE WHO PRESENTS WITH AMS. PT RESPONDS TO NAME, NO OTHER CHANGE IN MENTAL STATUS. PT HAS HX OF DEMNTIA, PER ED NOTE PT HAS HAD FREQUENT FALLS LATELY.   PMH:AMS, DE

## 2021-03-01 NOTE — PLAN OF CARE
Patient is a/o to self, pt is pleasant and cooperative for most of the shift, became increasingly agitated and reporting neck pain late afternoon- applied topical salicylate with hot pack to neck- pt resting comfortable now.   Neurology consulted per Dr. Dao Camryn without S/S of infection  Description: INTERVENTIONS:  - Assess and document risk factors for pressure ulcer development  - Assess and document skin integrity  - Assess and document dressing/incision, wound bed, drain sites and surrounding tissue  - Implem

## 2021-03-01 NOTE — PLAN OF CARE
Pt resting alert to self, confused & agitated at times. Suspicious of staff at times, comfort level improved with communication & reorientation. NPO d/t failed swallow eval, GI consulted. Afebrile, VSS, NSR on tele.   Briefed & incontinent, c/o neck pain transferring and ambulating w/ or w/o assistive devices  - Assist with transfers and ambulation using safe patient handling equipment as needed  - Ensure adequate protection for wounds/incisions during mobilization  - Obtain PT/OT consults as needed  - Adv

## 2021-03-02 ENCOUNTER — APPOINTMENT (OUTPATIENT)
Dept: GENERAL RADIOLOGY | Facility: HOSPITAL | Age: 86
End: 2021-03-02
Attending: INTERNAL MEDICINE
Payer: MEDICARE

## 2021-03-02 VITALS
DIASTOLIC BLOOD PRESSURE: 68 MMHG | TEMPERATURE: 99 F | WEIGHT: 114.19 LBS | BODY MASS INDEX: 20 KG/M2 | OXYGEN SATURATION: 99 % | SYSTOLIC BLOOD PRESSURE: 124 MMHG | RESPIRATION RATE: 18 BRPM | HEART RATE: 80 BPM

## 2021-03-02 LAB
ALBUMIN SERPL-MCNC: 2.2 G/DL (ref 3.4–5)
ALBUMIN/GLOB SERPL: 0.5 {RATIO} (ref 1–2)
ALP LIVER SERPL-CCNC: 80 U/L
ALT SERPL-CCNC: 8 U/L
ANION GAP SERPL CALC-SCNC: 8 MMOL/L (ref 0–18)
AST SERPL-CCNC: 15 U/L (ref 15–37)
BASOPHILS # BLD AUTO: 0.04 X10(3) UL (ref 0–0.2)
BASOPHILS NFR BLD AUTO: 0.4 %
BILIRUB SERPL-MCNC: 0.4 MG/DL (ref 0.1–2)
BUN BLD-MCNC: 9 MG/DL (ref 7–18)
BUN/CREAT SERPL: 16.7 (ref 10–20)
CALCIUM BLD-MCNC: 8.6 MG/DL (ref 8.5–10.1)
CHLORIDE SERPL-SCNC: 103 MMOL/L (ref 98–112)
CO2 SERPL-SCNC: 26 MMOL/L (ref 21–32)
CREAT BLD-MCNC: 0.54 MG/DL
DEPRECATED RDW RBC AUTO: 52 FL (ref 35.1–46.3)
EOSINOPHIL # BLD AUTO: 0 X10(3) UL (ref 0–0.7)
EOSINOPHIL NFR BLD AUTO: 0 %
ERYTHROCYTE [DISTWIDTH] IN BLOOD BY AUTOMATED COUNT: 15.6 % (ref 11–15)
GLOBULIN PLAS-MCNC: 4.1 G/DL (ref 2.8–4.4)
GLUCOSE BLD-MCNC: 110 MG/DL (ref 70–99)
HAV IGM SER QL: 1.9 MG/DL (ref 1.6–2.6)
HCT VFR BLD AUTO: 34.4 %
HGB BLD-MCNC: 11.1 G/DL
IMM GRANULOCYTES # BLD AUTO: 0.04 X10(3) UL (ref 0–1)
IMM GRANULOCYTES NFR BLD: 0.4 %
LYMPHOCYTES # BLD AUTO: 0.82 X10(3) UL (ref 1–4)
LYMPHOCYTES NFR BLD AUTO: 7.3 %
M PROTEIN MFR SERPL ELPH: 6.3 G/DL (ref 6.4–8.2)
MCH RBC QN AUTO: 29.4 PG (ref 26–34)
MCHC RBC AUTO-ENTMCNC: 32.3 G/DL (ref 31–37)
MCV RBC AUTO: 91 FL
MONOCYTES # BLD AUTO: 1.82 X10(3) UL (ref 0.1–1)
MONOCYTES NFR BLD AUTO: 16.3 %
NEUTROPHILS # BLD AUTO: 8.48 X10 (3) UL (ref 1.5–7.7)
NEUTROPHILS # BLD AUTO: 8.48 X10(3) UL (ref 1.5–7.7)
NEUTROPHILS NFR BLD AUTO: 75.6 %
OSMOLALITY SERPL CALC.SUM OF ELEC: 283 MOSM/KG (ref 275–295)
PHOSPHATE SERPL-MCNC: 3.3 MG/DL (ref 2.5–4.9)
PLATELET # BLD AUTO: 257 10(3)UL (ref 150–450)
POTASSIUM SERPL-SCNC: 3.5 MMOL/L (ref 3.5–5.1)
PROCALCITONIN SERPL-MCNC: 0.13 NG/ML (ref ?–0.16)
RBC # BLD AUTO: 3.78 X10(6)UL
SODIUM SERPL-SCNC: 137 MMOL/L (ref 136–145)
WBC # BLD AUTO: 11.2 X10(3) UL (ref 4–11)

## 2021-03-02 PROCEDURE — 71045 X-RAY EXAM CHEST 1 VIEW: CPT | Performed by: INTERNAL MEDICINE

## 2021-03-02 PROCEDURE — 99225 SUBSEQUENT OBSERVATION CARE: CPT | Performed by: INTERNAL MEDICINE

## 2021-03-02 RX ORDER — SCOLOPAMINE TRANSDERMAL SYSTEM 1 MG/1
1 PATCH, EXTENDED RELEASE TRANSDERMAL
Status: DISCONTINUED | OUTPATIENT
Start: 2021-03-02 | End: 2021-03-02

## 2021-03-02 NOTE — CM/SW NOTE
Spoke with dtr/TOOTIE Catherine ([phone]) at bedside, family wishes to pursue hospice care at [facility] under vitas. Reviewed w/Dr Doug Orosco, order placed. Referral sent via aidin. Await response, dtr hopefull for tx later today.  Call out to jose at [facility]

## 2021-03-02 NOTE — PROGRESS NOTES
Multidisciplinary Discharge Rounds held 3/2/2021. Treatment team members present today include , , Charge Nurse, Nurse, RT, PT and Pharmacy caring for [employer].      Other care providers present:    Mobility Goal: tur

## 2021-03-02 NOTE — PROGRESS NOTES
[address] [facility] Progress Note    Monsonwyn Gowers Patient Status:  Observation    1931 MRN [id number]   [facility] 5NW-A Attending Larry Pascal,    Hosp Day # 0 PCP Anneliese Tsang MD         Subjective:  Baljinder Suarez pupils noted on examination     Altered mental status, unspecified altered mental status type     Dysphagia, unspecified type      Labs:    Assessment/Plan:    Acute encephalopathy - dementia vs TME   Advanced dementia   Deconditioning  Weakness - generali

## 2021-03-02 NOTE — PROGRESS NOTES
NURSING DISCHARGE NOTE    Discharged Nursing home via Ambulance. Accompanied by Support staff  Belongings Taken by patient/family. Patient transferred back to [facility] on outpatient hospice. Plan of care reviewed with family. IV removed.  Ins

## 2021-03-02 NOTE — PHYSICAL THERAPY NOTE
Per chart review, pt and family have elected to pursue hospice. Will sign off skilled PT. Please re-consult if pt status should change.

## 2021-03-02 NOTE — DISCHARGE SUMMARY
[facility] HOSPITALIST  DISCHARGE SUMMARY     Venecia Harrell Patient Status:  Observation    1931 MRN [id number]   [facility] 5NW-A Attending No att. providers found   Hosp Day # 0 PCP Colleen Turner MD     Date of Admission:  following falls. She has a scalp and facial hematoma. Imaging including CT head and C-spine have been unremarkable. She reportedly has not been eating. She has been more confused. Unclear what her baseline is.   She is unaccompanied in the emergency de taking these medications    Calcium Carbonate Antacid 500 MG Chew  Commonly known as: TUMS        Calcium Citrate 200 MG Tabs        Divalproex Sodium 125 MG Csdr  Commonly known as: DEPAKOTE SPRINKLE        Donepezil HCl 10 MG Tabs  Commonly known as: ANYA

## 2021-03-02 NOTE — PROGRESS NOTES
[facility] HOSPITALIST  Progress Note     Kelli Pompa Patient Status:  Observation    1931 MRN [id number]   [facility] 5NW-A Attending Siam Alvarado, DO   Hosp Day # 0 PCP Gerardine Bloch, MD     Chief Complaint: AMS, FTT    S: BILT 0.3  --   --  0.4   TP 6.9  --   --  6.3*       Estimated Creatinine Clearance: 57.8 mL/min (A) (based on SCr of 0.54 mg/dL (L)). No results for input(s): PTP, INR in the last 168 hours.     Recent Labs   Lab 02/27/21  1233   TROP <0.045 Alexis Cast

## 2021-03-02 NOTE — SLP NOTE
Family has made decision to proceed with hospice today. Will discharge from services at this time.     Araceli Treviño MA, [address] [facility]  Pager

## 2021-03-02 NOTE — DIETARY NOTE
[facility]    NUTRITION ASSESSMENT    NUTRITION DIAGNOSIS/PROBLEM:    Inadequate energy intake related to decreased ability to consume sufficient energy intake (dementia, AMS) as evidenced by poor appetite/PO intakes and current NPO status.     Asa 0%-NPO  Intake Meeting Needs: No  Food Allergies: No  Cultural/Ethnic/Gnosticist Preferences Addresses: Yes    NUTRITION RELATED PHYSICAL FINDINGS:  Unable to conduct nutrition focused physical exam at this time.     NUTRITION PRESCRIPTION: 51.8 kg  Calories

## 2021-03-02 NOTE — PLAN OF CARE
A0x-self. Confused, agitated, and suspicious toward staff, states: \"You are trying to kill me, leave me alone\". Patient continually reoriented and made comfortable. Vss, febrile-Managed with rectal tylenol. ST on tele. Heparin. Incontinent x2, briefed. INTERVENTIONS:  - Assess and document risk factors for pressure ulcer development  - Assess and document skin integrity  - Assess and document dressing/incision, wound bed, drain sites and surrounding tissue  - Implement wound care per orders  - Initiate i

## 2021-03-08 NOTE — PROGRESS NOTES
03/01/21 2159   Provider Notification   Reason for Communication Order clarification   Provider Name Ed Denney MD   Method of Communication Page   Response See orders   Notification Time [phone]   Patient unable to undergo MRI due to agitation and be Biopsy Type: H and E

## 2022-08-20 NOTE — ED INITIAL ASSESSMENT (HPI)
Patient presents to [facility]. Care with cc of continued sinus drainage and now productive cough of thick green sputum. No fever noted. Was here last week for UTI and due to fly back to [place] tomorrow.
Accidental fall

## (undated) NOTE — IP AVS SNAPSHOT
[address]            (For Outpatient Use Only) Initial Admit Date: 2/27/2021   Inpt/Obs Admit Date: Inpt: N/A / Obs: 02/27/21   Discharge Date:    Zahra Knapp:  [de-identified]   MRN: [de-identified]   CSN: [id number]   CEID: [id number] Subscriber ID: [id number] Pt Rel to Subscriber: SELF   TERTIARY INSURANCE   Payor:  Plan:    Group Number:  Insurance Type:    Subscriber Name:  Subscriber :    Subscriber ID:  Pt Rel to Subscriber:    Hospital Account Financial Class: Medicare    Mar

## (undated) NOTE — MR AVS SNAPSHOT
EMG [address] [facility]  [address] [facility]  [place] [address] [phone]               Thank you for choosing us for your health care visit with ANJELICA Rod. We are glad to serve you and happy to provide you with this summary of your visit.   Lali given to anyone under 25years of age who is ill with a viral infection or fever. It may cause severe liver or brain damage. · Your appetite may be poor, so a light diet is fine.  Avoid dehydration by drinking 6 to 8 glasses of fluids per day (water, soft Most bladder infections are easily treated. They are not serious unless the infection spreads up to the kidney. The phrases \"bladder infection\", \"UTI,\" and \"cystitis,\" are often used to describe the same thing, but they are not always the same.  Cyst · Take antibiotics until they are used up, even if you feel better. It is important to finish them to make sure the infection has cleared. · You can use acetaminophen or ibuprofen for pain, fever, or discomfort, unless another medicine was prescribed.  You If a culture was done, you will be told if your treatment needs to be changed. If directed, you can call to find out the results. If X-rays were done, you will be told if the results will affect your treatment.   Call 911  Call emergency services if any of Commonly known as:  NAMENDA           Nitrofurantoin Monohyd Macro 100 MG Caps   Take 1 capsule (100 mg total) by mouth 2 (two) times daily. Commonly known as:  MACROBID           Pravastatin Sodium 10 MG Tabs   Take 10 mg by mouth.  At Bedtime   Commonly Visit [email or web address] to learn more. Educational Information     Your blood pressure indicates you may be at-risk for Hypertension. Please consider the following Lifestyle Modifications.   Also, please return for a follow-up Blood Pres

## (undated) NOTE — IP AVS SNAPSHOT
Patient Demographics     Address  [address] [place] [address] Phone  [phone] [facility]  [phone] [facility] E-mail Address  [email or web address]      Emergency Contact(s)     Name Relation Home Work Jess Nice [phone] [id number] acetaminophen (TYLENOL) 120 MG rectal suppository 120 mg 03/01/21 2055 Given      [id number] dextrose 5 %-0.45 % NaCl infusion 03/01/21 2238 New Bag      [id number] dextrose 5 %-0.45 % NaCl infusion 03/02/21 0656 New Bag            LEFT LOWER ABDO Note: Viral infection in the absence of secondary bacterial infection, auto-immune inflammation, mild localized bacterial infections rarely account for elevation of PCT >0.50 ng/mL.  PCT levels rise after a few hours, peak at the 12-24 hours, then fall at a MAGNESIUM [[id number]] (Normal)  Resulted: 03/02/21 6256, Result status: Final result   Ordering provider: Vick Mcgill DO  03/01/21 2300 Resulting lab: [facility])    Specimen Information    Type Source Collected On   Blo Rapid SARS-CoV-2 by PCR STAT [[id number]]  (Normal) Collected: 02/27/21 1247    Order Status: Completed Lab Status: Final result Updated: 02/27/21 1307    Specimen: Other from [facility]      Rapid SARS-CoV-2 by PCR Not Detected         H&P - H&P Note      H&P Problem Relation Age of Onset   • Breast Cancer Daughter    • Cancer Son         melanoma       Allergies:   Azithromycin              Penicillins               Sulfa Antibiotics           Medications:  No current facility-administered medications on file HEENT: Normocephalic atraumatic. Moist mucous membranes. EOM-I.[RM.1]  Pupils are pinpoint, healing facial bruises and scalp hematoma[RM. 2]   Neck: No lymphadenopathy. Eddye Cozier Respiratory: Clear to auscultation bilaterally. No wheezes. No rhonchi.   [place] Raw 5. Recent Falls with scalp hematoma and facial bruising, recent intracranial imaging x 2, no reported fall leading to this admission  6. Subclinical Hypothyroidism  1. Repeat TSH outpatient[RM.2]    Quality:  · DVT Prophylaxis:[RM.1] Heparin[RM. 2]  · CODE •  acetaminophen (TYLENOL) 120 MG rectal suppository 120 mg, 120 mg, Rectal, Q6H PRN    •  dextrose 5 %-0.45 % NaCl infusion, , Intravenous, Continuous    •  Heparin Sodium (Porcine) 5000 UNIT/ML injection 5,000 Units, 5,000 Units, Subcutaneous, Q8H [address] Motor strength: unable to assess, limited, seems weak all extremities  Tone: normal  DTRs: 1+ symmetric  Plantar response: bilateral flexor  Coordination: deferred  Sensory: symmetric   Gait: deferred  Romberg: deferred    Data and Notes Reviewed on 3/1/20 Per chart review, pt and family have elected to pursue hospice. Will sign off skilled PT. Please re-consult if pt status should change. [CY.1]      Attribution Marks    CY. 1 - Abhilash Calzada, GURVINDER on 3/2/2021  3:07 PM               Physical Therapy Note s Weight Bearing Restriction: None                PAIN ASSESSMENT  Ratin          COGNITION  · Attention Span:  difficulty dividing attention  · Orientation Level:  disoriented to place, disoriented to time and disoriented to situation  · Memory:  impair AM-PAC Score:  Raw Score: 17   Approx Degree of Impairment: 50.57%   Standardized Score (AM-PAC Scale): 42.13   CMS Modifier (G-Code): CK    FUNCTIONAL ABILITY STATUS  Gait Assessment   Gait Assistance: Minimum assistance  Distance (ft): 3  Assistive Devic Patient is a 80year old female admitted on 2/27/2021 with dyspnea and AMS. CT head (-) acute pathology. Pt with unintentional weight loss and FTT. Pertinent comorbidities and personal factors impacting therapy include dementia.  In this PT evaluation, the Occupational Therapy Notes (last 72 hours) (Notes from 2/27/2021  3:18 PM through 3/2/2021  3:18 PM)    No notes of this type exist for this encounter.         Video Swallow Study Note - Video Swallow Study Notes      Video Swallow Study Note signed by Stas Aragon Consistencies Presented: Puree(x1 spoonful only) to assess oropharyngeal swallow function and assess for compensatory strategies to improve safe swallow function. PUREE  Oral Phase of Swallow (VFSS - Puree):  Impaired  Bolus Retrieval (VFSS - Pu SLP donned goggles, surgical mask and gloves prior to evaluating patient. Pt unable to wear mask.                      FOLLOW UP  Treatment Plan/Recommendations: SLP to reassess  Number of Visits to Meet Established Goals: 1        Thank you for your referr

## (undated) NOTE — ED AVS SNAPSHOT
[facility] in [address]    Phone:  [phone]    Fax:  [phone]           Fred Gilmore   MRN: [id number]    Department:  THE [facility] in [facility]   Date of Visit:  1/5/2017 Insurance plans vary and the physician(s) referred by the Immediate Care may not be covered by your plan. Please contact your insurance company to determine coverage for follow-up care and referrals. [facility]  [address] If you have been prescribed any medication(s), please fill your prescription right away and begin taking the medication(s) as directed.     If the Immediate Care Provider has read X-rays, these will be re-interpreted by a radiologist.  If there is a signifi can help with your Affordable Care Act coverage, as well as all types of Medicaid plans. To get signed up and covered, please call [phone] and ask to get set up for an insurance coverage that is in-network with [facility]